# Patient Record
Sex: FEMALE | Race: BLACK OR AFRICAN AMERICAN
[De-identification: names, ages, dates, MRNs, and addresses within clinical notes are randomized per-mention and may not be internally consistent; named-entity substitution may affect disease eponyms.]

---

## 2018-10-08 ENCOUNTER — HOSPITAL ENCOUNTER (EMERGENCY)
Dept: HOSPITAL 72 - EMR | Age: 43
Discharge: HOME | End: 2018-10-08
Payer: MEDICAID

## 2018-10-08 VITALS — DIASTOLIC BLOOD PRESSURE: 70 MMHG | SYSTOLIC BLOOD PRESSURE: 117 MMHG

## 2018-10-08 VITALS — HEIGHT: 64 IN | WEIGHT: 143 LBS | BODY MASS INDEX: 24.41 KG/M2

## 2018-10-08 DIAGNOSIS — N83.202: ICD-10-CM

## 2018-10-08 DIAGNOSIS — B96.89: ICD-10-CM

## 2018-10-08 DIAGNOSIS — N76.0: Primary | ICD-10-CM

## 2018-10-08 DIAGNOSIS — D25.9: ICD-10-CM

## 2018-10-08 DIAGNOSIS — Z88.0: ICD-10-CM

## 2018-10-08 DIAGNOSIS — N85.4: ICD-10-CM

## 2018-10-08 LAB
APPEARANCE UR: CLEAR
APTT PPP: YELLOW S
GLUCOSE UR STRIP-MCNC: NEGATIVE MG/DL
KETONES UR QL STRIP: NEGATIVE
LEUKOCYTE ESTERASE UR QL STRIP: (no result)
NITRITE UR QL STRIP: NEGATIVE
PH UR STRIP: 6 [PH] (ref 4.5–8)
PROT UR QL STRIP: NEGATIVE
SP GR UR STRIP: 1.02 (ref 1–1.03)
UROBILINOGEN UR-MCNC: 4 MG/DL (ref 0–1)

## 2018-10-08 PROCEDURE — 96372 THER/PROPH/DIAG INJ SC/IM: CPT

## 2018-10-08 PROCEDURE — 81025 URINE PREGNANCY TEST: CPT

## 2018-10-08 PROCEDURE — 76830 TRANSVAGINAL US NON-OB: CPT

## 2018-10-08 PROCEDURE — 99284 EMERGENCY DEPT VISIT MOD MDM: CPT

## 2018-10-08 PROCEDURE — 76856 US EXAM PELVIC COMPLETE: CPT

## 2018-10-08 PROCEDURE — 81003 URINALYSIS AUTO W/O SCOPE: CPT

## 2018-10-08 NOTE — EMERGENCY ROOM REPORT
History of Present Illness


General


Chief Complaint:  Female Urogenital Problems


Source:  Patient





Present Illness


HPI


43-year-old female patient presents ER complaining of foul-smelling vaginal odor

, lower abdominal cramping and concern for retained tampon.  Reports that last 

Friday she put a tampon in and she thinks that she forgot to take it out prior 

to putting another tampon in.  Reports lower abdominal cramping and vaginal 

symptoms began after that time.  Reports one sexual partner, denies use of 

condoms.  Denies fever, chest pain, shortness of breath.  Denies flank pain or 

vomiting.denies dysuria, hematuria.


Allergies:  


Coded Allergies:  


     PENICILLINS (Unverified  Allergy, Unknown, Itching, 10/8/18)





Patient History


Past Medical History:  see triage record


Last Menstrual Period:  Oct 1, 2018


Reviewed Nursing Documentation:  PMH: Agreed; PSxH: Agreed





Nursing Documentation-PMH


Past Medical History:  No Stated History





Review of Systems


All Other Systems:  negative except mentioned in HPI





Physical Exam





Vital Signs








  Date Time  Temp Pulse Resp B/P (MAP) Pulse Ox O2 Delivery O2 Flow Rate FiO2


 


10/8/18 12:59 210.4 92 15 117/70 97 Room Air  





 210.4       








Sp02 EP Interpretation:  reviewed, normal


General Appearance:  well appearing, no apparent distress, alert, GCS 15, non-

toxic


Head:  normocephalic, atraumatic


Eyes:  bilateral eye normal inspection, bilateral eye PERRL


ENT:  hearing grossly normal, normal pharynx, no angioedema, normal voice, 

uvula midline, moist mucus membranes


Neck:  full range of motion


Respiratory:  lungs clear, normal breath sounds, no rhonchi, no respiratory 

distress, no accessory muscle use, no wheezing, speaking full sentences


Cardiovascular #1:  regular rate, rhythm, no edema


Gastrointestinal:  non tender, soft, no mass, non-distended, no guarding, no 

rebound


Genitourinary:  no CVA tenderness, ext genitalia/vag normal, other - no tampon 

visualized and vaginal vault, foul smelling odor noted with brown discharge


Musculoskeletal:  back normal, digits/nails normal, gait/station normal, normal 

range of motion, non-tender


Neurologic:  alert, oriented x3, responsive, motor strength/tone normal, 

sensory intact


Psychiatric:  mood/affect normal


Skin:  no rash


Lymphatic:  no adenopathy





Medical Decision Making


PA Attestation


Dr. Morrison  is my supervising Physician whom patient management has been 

discussed with.


Diagnostic Impression:  


 Primary Impression:  


 Bacterial vaginosis


 Additional Impressions:  


 Encounter for assessment of sexually transmitted disease exposure


 Fibroid, uterine


 Ovarian cyst


ER Course


Pt. presents to the ED c/o abdominal cramps, retained tampon, foul-smelling 

odor.





Ddx considered but are not limited to gonorrhea, chalmydia, cystitis, 

pylonephritis, cane foreign body. Bacterial vaginosis.


no abdominal tenderness to palpation, negative Rovsing, low suspicion for 

appendicitis, does not require CT imaging at this time.





Vital signs: are WNL, pt. is afebrile





Ordered UA and abx.





ER COURSE:


UA results show rbc's, urine protien negative, likely due to recent menstrual 

period, no signs of infection, does not require antibiotics at this time.


urine pregnancy negative.


Results discussed with doctor.


due to foul smelling odor and sexual activity without protection, will provide 

patient with treatment for STI and cover for bacterial vaginosis with 

metronidazole at discharge.


Provided patient with Rocephin and Azithromycin in the ER. Informed patient 

medications will cover for gonorrhea and chlamydia, needs further follow-up 

evaluation and possible treatment of other sexual transmitted infections.





on physical exam, no tampon visualized in the vaginal vault, patient seen and 

evaluated by Dr. Morrison who agrees no tampon visualized in Patient's vaginal 

vault.


consult with Dr. Morrison, will order a pelvic ultrasound to rule out 

underlying etiology for abdominal cramps.





Pelvic US uterine fibroids and  uterine cyst as well as ovarian cysts per the 

ultrasound technician.  likely causing pain symptoms


Results discussed with patient.advised patient to call tomorrow to get official 

report from radiologist reading.  Follow-up with OB/GYN specialist. Discuss 

referral with primary care provider.


Advised patient take Tylenol for pain symptoms.





ER precautions given, return to ER for new or worsening of symptoms.





Advised to use safe sex practices including but not limited to use of condoms. 

Avoid sexual activity for the next weeks.


Instructed patient to follow up with STI clinic and/or PCP for STI evaluation 

and further treatment as necessary.


Instructed patient to inform partners of needs for evaluation and treatment of 

possible infections.





DISCHARGE:


Rx provided for Metronidazole, do not drink while taking medication.





Patient is resting comfortably, in no acute distress, nontoxic appearing, 

talking without difficulty.


Patient to take medications as instructed


Will provide with patient care instructions and any necessary prescriptions.


Care plan and follow-up instructions provided.  Patient instructed to follow-up 

with primary care provider in 3 - 5 days.


Patient questions asked and answered.


Patient reports understanding and agreement to treatment plan.


ER precautions given. Patient instructed to return to ER immediately for any 

new or worsening of symptoms including but not limited to increasing SOB, 

persistent fever. 





- Please note that this Emergency Department Report was dictated using [employer] technology software, occasionally this can lead to 

erroneous entry secondary to interpretation by the dictation equipment.





Labs








Test


  10/8/18


15:05


 


Urine Color Yellow 


 


Urine Appearance Clear 


 


Urine pH 6 (4.5-8.0) 


 


Urine Specific Gravity


  1.020


(1.005-1.035)


 


Urine Protein


  Negative


(NEGATIVE)


 


Urine Glucose (UA)


  Negative


(NEGATIVE)


 


Urine Ketones


  Negative


(NEGATIVE)


 


Urine Blood 4+ (NEGATIVE) 


 


Urine Nitrite


  Negative


(NEGATIVE)


 


Urine Bilirubin


  Negative


(NEGATIVE)


 


Urine Urobilinogen


  4 MG/DL


(0.0-1.0)


 


Urine Leukocyte Esterase 3+ (NEGATIVE) 


 


Urine RBC


  5-10 /HPF (0 -


2)


 


Urine WBC


  2-4 /HPF (0 -


2)


 


Urine Squamous Epithelial


Cells Few /LPF


(NONE/OCC)


 


Urine Bacteria


  Few /HPF


(NONE)


 


Urine HCG, Qualitative


  Negative


(NEGATIVE)








CT/MRI/US Diagnostic Results


CT/MRI/US Diagnostic Results :  


   Imaging Test Ordered:  Pelvic US


   Impression


per ultrasound technician, multiple fibroids, ovarian cysts, uterine cyst





Last Vital Signs








  Date Time  Temp Pulse Resp B/P (MAP) Pulse Ox O2 Delivery O2 Flow Rate FiO2


 


10/8/18 12:59 99.1 91 15 117/70 97 Room Air  





 99.1       








Disposition:  HOME, SELF-CARE


Condition:  Stable


Scripts


Metronidazole* (FLAGYL*) 500 Mg Tablet


500 MG ORAL BID for 7 Days, #14 TAB


   Prov: Shadi Rodrigues         10/8/18


Referrals:  


[facility]ERR[facility] (PCP)


Patient Instructions:  Bacterial Vaginosis, Easy-to-Read, Ovarian Cyst, 

Sexually Transmitted Disease, Uterine Fibroids, Easy-to-Read





Additional Instructions:  


Followup with primary care provider and discuss referral to OB/GYN and/or  

specialist as needed.


Followup with STI clinic for further evaluation and treatment.


Alert sexual partners for need for evaluation and treatment.


Wear condoms during sex.


Avoid sexual activity for 2 weeks.


Drink plenty of fluids. 


Do not drink alcohol while taking medication.


Take Tylenol for pain symptoms.


Patient questions asked and answered.


ER precautions given, patient instructed to return to ER immediately for any 

new or worsening of symptoms.











Shadi Rodrigues Oct 8, 2018 13:46

## 2018-10-08 NOTE — DIAGNOSTIC IMAGING REPORT
Indication:Lower abdominal and pelvic pain

 

Technique: Grayscale and duplex Doppler imaging of the pelvis performed utilizing a

transabdominal scan.

 

Comparison: None

 

Findings:

 

Retroverted uterus demonstrated. There is a small cyst in the fundal aspect of the

uterus. Endometrium is about 5 mm in thickness. There are hypoechoic lesions

throughout the uterus probably fibroids. In the left adnexa there is a 2.8 x 2.2 cm

cyst with a fluid fluid level present. There is dopplerable blood flow within both

ovaries. There is no free fluid of significance.

 

IMPRESSION:

 

Small cystic focus in the uterine myometrium close to the endometrium suspicious for

adenomyosis.

 

Multiple uterine fibroids. Retroverted uterus

 

Hemorrhagic left ovarian cyst. Follow-up recommended.

## 2018-10-19 ENCOUNTER — HOSPITAL ENCOUNTER (EMERGENCY)
Dept: HOSPITAL 72 - EMR | Age: 43
Discharge: HOME | End: 2018-10-19
Payer: MEDICAID

## 2018-10-19 VITALS — SYSTOLIC BLOOD PRESSURE: 100 MMHG | DIASTOLIC BLOOD PRESSURE: 71 MMHG

## 2018-10-19 VITALS — HEIGHT: 63 IN | WEIGHT: 145 LBS | BODY MASS INDEX: 25.69 KG/M2
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
## 2018-10-19 DIAGNOSIS — S16.1XXA: Primary | ICD-10-CM

## 2018-10-19 DIAGNOSIS — Z88.0: ICD-10-CM

## 2018-10-19 PROCEDURE — 96372 THER/PROPH/DIAG INJ SC/IM: CPT

## 2018-10-19 PROCEDURE — 99283 EMERGENCY DEPT VISIT LOW MDM: CPT

## 2018-10-19 NOTE — EMERGENCY ROOM REPORT
History of Present Illness


General


Chief Complaint:  Neck Pain


Source:  Patient





Present Illness


HPI


43-year-old female patient presents ER complaining of left lower extremity 

weakness 1 day.  Patient reports that she woke up and began to experience pain 

and stiffness on the left side of her neck that radiates to her arrival line.  

Patient reports "I think I slept on it wrong".  Patient denies fever, chest pain

, shortness of breath.  Denies acute injury or trauma.  Denies history of neck 

problems. denies pregnancy.  Reports up to date on vaccinations. reports took 2 

Advil for pain symptoms earlier.  Denies taking any medications.


Allergies:  


Coded Allergies:  


     PENICILLINS (Unverified  Allergy, Unknown, Itching, 10/8/18)





Patient History


Past Medical History:  see triage record


Last Menstrual Period:  last week


Reviewed Nursing Documentation:  PMH: Agreed; PSxH: Agreed





Nursing Documentation-PMH


Past Medical History:  No Stated History





Review of Systems


All Other Systems:  negative except mentioned in HPI





Physical Exam





Vital Signs








  Date Time  Temp Pulse Resp B/P (MAP) Pulse Ox O2 Delivery O2 Flow Rate FiO2


 


10/19/18 13:16 98.6 75 16 100/71 97 Room Air  





 98.6       








Sp02 EP Interpretation:  reviewed, normal


General Appearance:  well appearing, no apparent distress, alert, GCS 15, non-

toxic


Head:  normocephalic, atraumatic


Eyes:  bilateral eye normal inspection, bilateral eye PERRL


ENT:  hearing grossly normal, normal pharynx, no angioedema, normal voice, 

uvula midline, moist mucus membranes


Neck:  no meningismus, no bony tend, limited range of motion - secondary to pain


Respiratory:  lungs clear, normal breath sounds, no rhonchi, no respiratory 

distress, no accessory muscle use, no wheezing, speaking full sentences


Cardiovascular #1:  regular rate, rhythm, no edema


Genitourinary:  no CVA tenderness


Musculoskeletal:  back normal, digits/nails normal, gait/station normal, normal 

range of motion, non-tender


Neurologic:  alert, oriented x3, responsive, motor strength/tone normal, 

sensory intact


Psychiatric:  mood/affect normal


Skin:  no rash


Lymphatic:  no adenopathy





Medical Decision Making


PA Attestation


Dr. Tubbs is my supervising Physician whom patient management has been 

discussed with.


Diagnostic Impression:  


 Primary Impression:  


 Neck muscle strain


ER Course


Pt. presents to the ED c/o left side neck stiffness.





Ddx considered but are not limited to fracture, sprain, strain, contusion, 

dislocation, meningitis, medication use, muscle spasm.


No erythema, no warmth to touch, no fever, nontoxic appearing, low suspicion 

for septic joint.


Patient afebrile, nontoxic appearing, low suspicion for meningitis at this time.





Vital signs: are WNL, pt. is afebrile





Ordered X-ray and pain medication.





ER COURSE


Provided with pain medication, muscle relaxant, and lidocaine patch.


denies acute injury or trauma.  No bony tenderness or tenderness laterally.   

Does not require X-ray at this time.  Likely muscle strain versus spasm causing 

pain symptoms. Followup with PCP and discuss further referral and imaging to PT 

and for MRI as needed.





Patient instructed on RICE method: rest, ice, compression, elevation.


Patient instructed on rest, ice and heat.





Contact information for orthopedic urgent care provided, follow-up with urgent 

care if unable to followup with primary care provider and get referral to 

orthopedic specialist.


Followup with primary care provider. Discuss referral to ortho/pain management/

PT as needed. Discuss further imaging with MRI/CT as needed.





DISCHARGE:


-Rx provided for Tylenol for pain symptoms.


-Rx provided for Methocarbamol. SE drowsiness, do not drink, drive, or operate 

heavy machinery while using.


- Rx provided for Lidocaine patch





At this time pt. is stable for d/c to home. Patient is resting comfortably, in 

no acute distress, nontoxic appearing, talking without difficulty.


Will provide printed patient care instructions, and any necessary prescriptions.


Patient instructed to follow with primary care provider in 3 - 5 days and to 

request further follow-up as needed.


Care plan and follow up instructions have been discussed with the patient prior 

to discharge.


Take medications as directed. 


Patient questions asked and answered.


Patient reports understanding and agreement to treatment plan.


ER precautions given, patient instructed to return to ER immediately for any 

new or worsening of symptoms.





- Please note that this Emergency Department Report was dictated using [employer] technology software, occasionally this can lead to 

erroneous entry secondary to interpretation by the dictation equipment.





Last Vital Signs








  Date Time  Temp Pulse Resp B/P (MAP) Pulse Ox O2 Delivery O2 Flow Rate FiO2


 


10/19/18 13:16 98.6 75 16 100/71 97 Room Air  





 98.6       








Status:  improved


Disposition:  HOME, SELF-CARE


Condition:  Stable


Scripts


Acetaminophen* (TYLENOL EXTRA STRENGTH*) 500 Mg Tablet


500 MG ORAL Q8H PRN for Prn Headache/Temp > 101, #30 TAB 0 Refills


   Prov: Shadi Rodrigues         10/19/18 


Methocarbamol* (ROBAXIN*) 500 Mg Tablet


500 MG PO TID, #21 TAB 0 Refills


   Prov: Shadi Rodrigues         10/19/18 


Lidocaine (Lidocaine) 1 Each Adh..patch


5 % TP DAILY for 7 Days, #7 PATCH


   Prov: Shadi Rodrigues         10/19/18


Patient Instructions:  Cervical Strain and Sprain With Rehab-[facility], Muscle 

Cramps and Spasms, Easy-to-Read





Additional Instructions:  


Patient instructed to follow up with primary care provider 3-5 and discuss 

further referral and imaging at that time. Discuss referral to physical therapy 

and pain management as needed.


Patient instructed on rest, ice and heat.


Do not take muscle relaxant prior to drinking, driving, or operating heavy 

machinery.


Take medications as directed. 


Patient questions asked and answered.


ER precautions given, patient instructed to return to ER immediately for any 

new or worsening of symptoms.








[facility]


[id number]


[place], [address]


[phone]


[email or web address]











Shadi Rodrigues Oct 19, 2018 13:44

## 2018-10-22 ENCOUNTER — HOSPITAL ENCOUNTER (EMERGENCY)
Dept: HOSPITAL 72 - EMR | Age: 43
Discharge: HOME | End: 2018-10-22
Payer: MEDICAID

## 2018-10-22 VITALS — SYSTOLIC BLOOD PRESSURE: 102 MMHG | DIASTOLIC BLOOD PRESSURE: 67 MMHG

## 2018-10-22 VITALS — WEIGHT: 145 LBS | BODY MASS INDEX: 25.69 KG/M2 | HEIGHT: 63 IN

## 2018-10-22 DIAGNOSIS — M54.2: ICD-10-CM

## 2018-10-22 DIAGNOSIS — M62.838: Primary | ICD-10-CM

## 2018-10-22 DIAGNOSIS — Z88.2: ICD-10-CM

## 2018-10-22 PROCEDURE — 99283 EMERGENCY DEPT VISIT LOW MDM: CPT

## 2018-10-22 PROCEDURE — 96372 THER/PROPH/DIAG INJ SC/IM: CPT

## 2018-10-22 NOTE — EMERGENCY ROOM REPORT
History of Present Illness


General


Chief Complaint:  Pain


Source:  Patient





Present Illness


HPI


This patient states that last week she had woke up with left-sided neck pain.  

She states she was seen here and has been using Tylenol and Robaxin without 

relief.  She states she was prescribed Lidoderm patches but they were too 

expensive.  She states that she is sleeping on a couch at a board and care 

home.  She states that she knows this is contributing to the persistence of her 

symptoms.  She denies any new symptoms.  She denies headache.  She is fever or 

chills.  She denies nausea or vomiting.  She denies sore throat.  She has no 

other complaints.


Allergies:  


Coded Allergies:  


     PENICILLINS (Unverified  Allergy, Unknown, Itching, 10/8/18)





Patient History


Past Medical History:  none, see triage record


Social History:  Denies: smoking, alcohol use, drug use


Pregnant Now:  No


Reviewed Nursing Documentation:  PMH: Agreed; PSxH: Agreed





Nursing Documentation-PMH


Past Medical History:  No Stated History





Review of Systems


All Other Systems:  negative except mentioned in HPI





Physical Exam





Vital Signs








  Date Time  Temp Pulse Resp B/P (MAP) Pulse Ox O2 Delivery O2 Flow Rate FiO2


 


10/22/18 08:23 98.8 78 18 98/62 98 Room Air  





 98.8       








Sp02 EP Interpretation:  reviewed, normal


General Appearance:  no apparent distress, alert, GCS 15, non-toxic


Head:  normocephalic, atraumatic


Eyes:  bilateral eye normal inspection, bilateral eye PERRL


ENT:  hearing grossly normal, normal pharynx, no angioedema, normal voice


Neck:  full range of motion, supple/symm/no masses, tender lateral - TTP along 

the L. trapezius m. with palpable spasm.


Respiratory:  chest non-tender, lungs clear, normal breath sounds, no 

respiratory distress, no retraction, no accessory muscle use, speaking full 

sentences


Rectal:  deferred


Musculoskeletal:  back normal, gait/station normal, normal range of motion, non-

tender, other - See above in Neck.


Neurologic:  alert, oriented x3, responsive, motor strength/tone normal, 

sensory intact, speech normal


Psychiatric:  judgement/insight normal, memory normal, mood/affect normal, no 

suicidal/homicidal ideation


Skin:  normal color, no rash, warm/dry, well hydrated





Medical Decision Making


Diagnostic Impression:  


 Primary Impression:  


 Trapezius muscle spasm


ER Course


This patient has a clinical presentation consistent with left trapezius muscle 

spasm.  There are no red flags on physical exam or history that would make me 

concerned for underlying fracture.  Therefore, I do not feel that I need to 

obtain imaging studies.  The patient has pain with range of motion and has 

tenderness to palpation along the muscle.  There is no evidence of compartment 

syndrome.  There is no neurologic deficit.  The patient was instructed on 

supportive home measures.  No emergency medical condition was identified.  The 

patient was given return precautions and followup instructions.





Last Vital Signs








  Date Time  Temp Pulse Resp B/P (MAP) Pulse Ox O2 Delivery O2 Flow Rate FiO2


 


10/22/18 08:23 98.8 78 18 98/62 98 Room Air  





 98.8       








Status:  improved


Disposition:  HOME, SELF-CARE


Condition:  Improved











Kina Morrison DO Oct 22, 2018 08:36

## 2019-06-05 ENCOUNTER — HOSPITAL ENCOUNTER (EMERGENCY)
Dept: HOSPITAL 72 - EMR | Age: 44
Discharge: HOME | End: 2019-06-05
Payer: MEDICAID

## 2019-06-05 VITALS — BODY MASS INDEX: 23.92 KG/M2 | HEIGHT: 63 IN | WEIGHT: 135 LBS

## 2019-06-05 VITALS — DIASTOLIC BLOOD PRESSURE: 76 MMHG | SYSTOLIC BLOOD PRESSURE: 112 MMHG

## 2019-06-05 VITALS — DIASTOLIC BLOOD PRESSURE: 72 MMHG | SYSTOLIC BLOOD PRESSURE: 110 MMHG

## 2019-06-05 DIAGNOSIS — Z88.0: ICD-10-CM

## 2019-06-05 DIAGNOSIS — N39.0: Primary | ICD-10-CM

## 2019-06-05 DIAGNOSIS — M54.5: ICD-10-CM

## 2019-06-05 LAB
APPEARANCE UR: (no result)
APTT PPP: (no result) S
GLUCOSE UR STRIP-MCNC: NEGATIVE MG/DL
KETONES UR QL STRIP: NEGATIVE
LEUKOCYTE ESTERASE UR QL STRIP: (no result)
NITRITE UR QL STRIP: POSITIVE
PH UR STRIP: 7 [PH] (ref 4.5–8)
PROT UR QL STRIP: (no result)
SP GR UR STRIP: 1 (ref 1–1.03)
UROBILINOGEN UR-MCNC: 1 MG/DL (ref 0–1)

## 2019-06-05 PROCEDURE — 87086 URINE CULTURE/COLONY COUNT: CPT

## 2019-06-05 PROCEDURE — 87181 SC STD AGAR DILUTION PER AGT: CPT

## 2019-06-05 PROCEDURE — 81003 URINALYSIS AUTO W/O SCOPE: CPT

## 2019-06-05 PROCEDURE — 81025 URINE PREGNANCY TEST: CPT

## 2019-06-05 PROCEDURE — 99283 EMERGENCY DEPT VISIT LOW MDM: CPT

## 2019-06-05 NOTE — EMERGENCY ROOM REPORT
History of Present Illness


General


Chief Complaint:  Female Urogenital Problems


Source:  Patient





Present Illness


HPI


Patient is a 44-year-old female presents after increased dysuria for the past 4 

days.  Patient had gradual onset of worsening urinary frequency as well as 

increased suprapubic pain.  She reports having increased frequency of urination 

with small amounts of urine.  She denies any hematuria.  She states that she 

had recently been tested for STDs without any positive findings.  She denies 

any vomiting.  She reports having some low back pain.  She denies any diarrhea.


Allergies:  


Coded Allergies:  


     PENICILLINS (Unverified  Allergy, Unknown, Itching, 10/8/18)





Patient History


Last Menstrual Period:  05/20/19


Reviewed Nursing Documentation:  PMH: Agreed; PSxH: Agreed





Nursing Documentation-PMH


Past Medical History:  No Stated History





Review of Systems


All Other Systems:  negative except mentioned in HPI





Physical Exam





Vital Signs








  Date Time  Temp Pulse Resp B/P (MAP) Pulse Ox O2 Delivery O2 Flow Rate FiO2


 


6/5/19 10:09 97.9 79 16 105/72 (83) 99 Room Air  








Sp02 EP Interpretation:  reviewed, normal


General Appearance:  normal inspection, well appearing, no apparent distress, 

alert, GCS 15


Head:  atraumatic


ENT:  normal ENT inspection, hearing grossly normal, normal voice


Neck:  normal inspection, full range of motion, supple, no bony tend


Respiratory:  normal inspection, lungs clear, normal breath sounds, no 

respiratory distress, no retraction, no wheezing


Cardiovascular #1:  regular rate, rhythm, no edema


Gastrointestinal:  normal bowel sounds, non tender, soft, no guarding, no hernia

, tenderness - suprapubic


Genitourinary:  no CVA tenderness


Musculoskeletal:  normal inspection, back normal, normal range of motion


Neurologic:  normal inspection, alert, oriented x3, responsive, CNs III-XII nml 

as tested, speech normal


Psychiatric:  normal inspection, judgement/insight normal, mood/affect normal


Skin:  normal inspection, normal color, no rash





Medical Decision Making


Diagnostic Impression:  


 Primary Impression:  


 Urinary tract infection


ER Course


Patient presented for dysuria.  Differential diagnosis included was not limited 

to appendicitis, urinary tract infection, pelvic inflammatory disease, 

urethritis, herpes among others.  Patient has a benign exam and does not appear 

to require any further imaging or laboratory testing at this time.  Patient was 

advised to follow-up with her primary care physician for recheck.  She appears 

to have a urinary infection.  She will be treated empirically with Keflex.  

Patient was noted to be not allergic to penicillin but states that she gets 

yeast infections after taking it.Patient appears stable for outpatient 

management.  She was given first dose of antibiotics in the emergency 

department.





Last Vital Signs








  Date Time  Temp Pulse Resp B/P (MAP) Pulse Ox O2 Delivery O2 Flow Rate FiO2


 


6/5/19 10:19 98.1 76 17 112/76 100 Room Air  








Status:  improved


Disposition:  HOME, SELF-CARE


Condition:  Stable


Scripts


Phenazopyridine Hcl* (PYRIDIUM*) 200 Mg Tablet


200 MG ORAL THREE TIMES A DAY, #14 TAB 0 Refills


   Prov: Barrington Silva MD         6/5/19 


Cephalexin* (KEFLEX*) 500 Mg Capsule


500 MG ORAL EVERY 6 HOURS, #28 CAP


   Prov: Barrington Silva MD         6/5/19


Patient Instructions:  Urinary Tract Infection











Barrington Silva MD Jun 5, 2019 11:07

## 2019-06-05 NOTE — NUR
ED Nurse Note:



PT LAYING PEACEFULLY IN BED IN NAD. AOX4. PRESCRIPTIONS AND DISCHARGE PAPERWORK 
EXPLAINED TO PT. PT VERBALIZES UNDERSTANDING AND ALL QUESTIONS ANSWERED. 
PRESCRIPTIONS SENT ELECTRONICALLY TO PT'S PHARMACY. DISCHARGE PAPERWORK GIVEN 
TO PT AND ID WRISTBAND REMOVED. PT WALKED OUT OF ER WITH STEADY GAIT AND ALL 
BELONGINGS.

## 2019-06-05 NOTE — NUR
ED Nurse Note:



PT WALKED IN TO ER TODAY FROM HOME. AOX4. PT C/O INCREASE IN URINARY FREQUENCY 
AND URGENCY X 4 DAYS AGO. PT DENIES PAINFUL URINATION OR BURNING SENSATION. PT 
ALSO C/O WHITE WATERY VAGINAL DISCHARGE X 4 DAYS AGO. PT DENIES VAGINAL 
BLEEDING, SWELLING, ITCHING OR REDNESS.

## 2020-11-25 ENCOUNTER — HOSPITAL ENCOUNTER (EMERGENCY)
Dept: HOSPITAL 72 - EMR | Age: 45
Discharge: HOME | End: 2020-11-25
Payer: SELF-PAY

## 2020-11-25 VITALS — DIASTOLIC BLOOD PRESSURE: 79 MMHG | SYSTOLIC BLOOD PRESSURE: 126 MMHG

## 2020-11-25 VITALS — DIASTOLIC BLOOD PRESSURE: 85 MMHG | SYSTOLIC BLOOD PRESSURE: 129 MMHG

## 2020-11-25 VITALS — HEIGHT: 63 IN | BODY MASS INDEX: 26.4 KG/M2 | WEIGHT: 149 LBS

## 2020-11-25 DIAGNOSIS — Z88.0: ICD-10-CM

## 2020-11-25 DIAGNOSIS — S00.83XA: Primary | ICD-10-CM
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
## 2020-11-25 PROCEDURE — 70486 CT MAXILLOFACIAL W/O DYE: CPT

## 2020-11-25 PROCEDURE — 70450 CT HEAD/BRAIN W/O DYE: CPT

## 2020-11-25 PROCEDURE — 96372 THER/PROPH/DIAG INJ SC/IM: CPT

## 2020-11-25 PROCEDURE — 99284 EMERGENCY DEPT VISIT MOD MDM: CPT
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
## 2020-11-25 NOTE — NUR
ED Nurse Note:



lapd at bedside. pt refused to speak with [employer] at this time. Charge RN and ERMD 
aware.

## 2020-11-25 NOTE — DIAGNOSTIC IMAGING REPORT
EXAM:

  CT Maxillofacial Without Intravenous Contrast

 

CLINICAL HISTORY:

  INJ

 

TECHNIQUE:

  Axial computed tomography images of the face without intravenous 

contrast.  CTDI is 15.30 mGy and DLP is 315.50 mGy-cm.  One or more of 

the following dose reduction techniques were used: automated exposure 

control, adjustment of the mA and/or kV according to patient size, use of 

iterative reconstruction technique.

  Coronal reformatted images were created and reviewed.

  Axial reformatted images were created and reviewed.

 

COMPARISON:

  No relevant prior studies available.

 

FINDINGS:

  Bones/joints:  No acute fracture.

  Soft tissues:  Left facial soft tissue swelling, hematoma, and 

contusion over the angle of the left mandible.  Remaining soft tissue 

structures unremarkable.

  Orbits:  Unremarkable.

  Sinuses:  Unremarkable.  No air-fluid levels.

  Brain:  For intracranial findings, please see dedicated CT head report 

from the same date.

 

IMPRESSION:     

1.  For intracranial findings, please see dedicated CT head report from 

the same date.

2.  Left facial soft tissue swelling, hematoma, and contusion over the 

angle of the left mandible.

3.  No acute osseous abnormality.

4.  No other acute traumatic injury.

## 2020-11-25 NOTE — DIAGNOSTIC IMAGING REPORT
EXAM:

  CT Head Without Intravenous Contrast

 

CLINICAL HISTORY:

  INJ

 

TECHNIQUE:

  Axial computed tomography images of the head/brain without intravenous 

contrast.  CTDI is 53.40 mGy and DLP is 1179.10 mGy-cm.  One or more of 

the following dose reduction techniques were used: automated exposure 

control, adjustment of the mA and/or kV according to patient size, use of 

iterative reconstruction technique.

 

COMPARISON:

  4/1/14

 

FINDINGS:

  Brain:  Unremarkable.  No hemorrhage.  No significant white matter 

disease.  No edema.

  Ventricles:  Unremarkable.  No ventriculomegaly.

  Bones/joints:  Unremarkable.  No acute fracture.

  Soft tissues:  Unremarkable.

  Sinuses:  Unremarkable as visualized.  No acute sinusitis.

  Mastoid air cells:  Unremarkable as visualized.  No mastoid effusion.

 

IMPRESSION:     

  No acute intracranial abnormality.